# Patient Record
Sex: FEMALE | Race: ASIAN | Employment: UNEMPLOYED | URBAN - METROPOLITAN AREA
[De-identification: names, ages, dates, MRNs, and addresses within clinical notes are randomized per-mention and may not be internally consistent; named-entity substitution may affect disease eponyms.]

---
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
## 2023-10-09 ENCOUNTER — OFFICE VISIT (OUTPATIENT)
Dept: INTERNAL MEDICINE CLINIC | Facility: CLINIC | Age: 23
End: 2023-10-09
Payer: COMMERCIAL

## 2023-10-09 ENCOUNTER — LAB ENCOUNTER (OUTPATIENT)
Dept: LAB | Age: 23
End: 2023-10-09
Attending: INTERNAL MEDICINE
Payer: COMMERCIAL

## 2023-10-09 VITALS
DIASTOLIC BLOOD PRESSURE: 68 MMHG | HEART RATE: 99 BPM | WEIGHT: 178.38 LBS | HEIGHT: 64 IN | SYSTOLIC BLOOD PRESSURE: 110 MMHG | TEMPERATURE: 97 F | OXYGEN SATURATION: 99 % | BODY MASS INDEX: 30.45 KG/M2 | RESPIRATION RATE: 17 BRPM

## 2023-10-09 DIAGNOSIS — Z13.228 SCREENING FOR METABOLIC DISORDER: ICD-10-CM

## 2023-10-09 DIAGNOSIS — Z13.0 SCREENING FOR BLOOD DISEASE: ICD-10-CM

## 2023-10-09 DIAGNOSIS — Z23 NEEDS FLU SHOT: ICD-10-CM

## 2023-10-09 DIAGNOSIS — Z00.00 WELLNESS EXAMINATION: Primary | ICD-10-CM

## 2023-10-09 DIAGNOSIS — Z13.1 SCREENING FOR DIABETES MELLITUS: ICD-10-CM

## 2023-10-09 DIAGNOSIS — F90.9 ATTENTION DEFICIT HYPERACTIVITY DISORDER (ADHD), UNSPECIFIED ADHD TYPE: ICD-10-CM

## 2023-10-09 DIAGNOSIS — Z13.29 SCREENING FOR THYROID DISORDER: ICD-10-CM

## 2023-10-09 DIAGNOSIS — Z13.220 SCREENING, LIPID: ICD-10-CM

## 2023-10-09 PROBLEM — F90.2 ATTENTION DEFICIT HYPERACTIVITY DISORDER (ADHD), COMBINED TYPE: Status: ACTIVE | Noted: 2023-10-09

## 2023-10-09 LAB
ALBUMIN SERPL-MCNC: 4 G/DL (ref 3.4–5)
ALBUMIN/GLOB SERPL: 1 {RATIO} (ref 1–2)
ALP LIVER SERPL-CCNC: 49 U/L
ALT SERPL-CCNC: 34 U/L
ANION GAP SERPL CALC-SCNC: 5 MMOL/L (ref 0–18)
AST SERPL-CCNC: 12 U/L (ref 15–37)
BASOPHILS # BLD AUTO: 0.08 X10(3) UL (ref 0–0.2)
BASOPHILS NFR BLD AUTO: 0.5 %
BILIRUB SERPL-MCNC: 0.3 MG/DL (ref 0.1–2)
BUN BLD-MCNC: 7 MG/DL (ref 7–18)
CALCIUM BLD-MCNC: 10.4 MG/DL (ref 8.5–10.1)
CHLORIDE SERPL-SCNC: 105 MMOL/L (ref 98–112)
CHOLEST SERPL-MCNC: 167 MG/DL (ref ?–200)
CO2 SERPL-SCNC: 24 MMOL/L (ref 21–32)
CREAT BLD-MCNC: 0.82 MG/DL
EGFRCR SERPLBLD CKD-EPI 2021: 102 ML/MIN/1.73M2 (ref 60–?)
EOSINOPHIL # BLD AUTO: 0.12 X10(3) UL (ref 0–0.7)
EOSINOPHIL NFR BLD AUTO: 0.8 %
ERYTHROCYTE [DISTWIDTH] IN BLOOD BY AUTOMATED COUNT: 13.3 %
FASTING PATIENT LIPID ANSWER: YES
FASTING STATUS PATIENT QL REPORTED: YES
GLOBULIN PLAS-MCNC: 4.1 G/DL (ref 2.8–4.4)
GLUCOSE BLD-MCNC: 91 MG/DL (ref 70–99)
HCT VFR BLD AUTO: 38.4 %
HDLC SERPL-MCNC: 39 MG/DL (ref 40–59)
HGB BLD-MCNC: 12.4 G/DL
IMM GRANULOCYTES # BLD AUTO: 0.08 X10(3) UL (ref 0–1)
IMM GRANULOCYTES NFR BLD: 0.5 %
LDLC SERPL CALC-MCNC: 98 MG/DL (ref ?–100)
LYMPHOCYTES # BLD AUTO: 2.11 X10(3) UL (ref 1–4)
LYMPHOCYTES NFR BLD AUTO: 13.4 %
MCH RBC QN AUTO: 27.7 PG (ref 26–34)
MCHC RBC AUTO-ENTMCNC: 32.3 G/DL (ref 31–37)
MCV RBC AUTO: 85.7 FL
MONOCYTES # BLD AUTO: 0.77 X10(3) UL (ref 0.1–1)
MONOCYTES NFR BLD AUTO: 4.9 %
NEUTROPHILS # BLD AUTO: 12.57 X10 (3) UL (ref 1.5–7.7)
NEUTROPHILS # BLD AUTO: 12.57 X10(3) UL (ref 1.5–7.7)
NEUTROPHILS NFR BLD AUTO: 79.9 %
NONHDLC SERPL-MCNC: 128 MG/DL (ref ?–130)
OSMOLALITY SERPL CALC.SUM OF ELEC: 276 MOSM/KG (ref 275–295)
PLATELET # BLD AUTO: 376 10(3)UL (ref 150–450)
POTASSIUM SERPL-SCNC: 4.1 MMOL/L (ref 3.5–5.1)
PROT SERPL-MCNC: 8.1 G/DL (ref 6.4–8.2)
RBC # BLD AUTO: 4.48 X10(6)UL
SODIUM SERPL-SCNC: 134 MMOL/L (ref 136–145)
TRIGL SERPL-MCNC: 173 MG/DL (ref 30–149)
TSI SER-ACNC: 1.43 MIU/ML (ref 0.36–3.74)
VLDLC SERPL CALC-MCNC: 29 MG/DL (ref 0–30)
WBC # BLD AUTO: 15.7 X10(3) UL (ref 4–11)

## 2023-10-09 PROCEDURE — 90471 IMMUNIZATION ADMIN: CPT | Performed by: INTERNAL MEDICINE

## 2023-10-09 PROCEDURE — 80050 GENERAL HEALTH PANEL: CPT | Performed by: INTERNAL MEDICINE

## 2023-10-09 PROCEDURE — 3074F SYST BP LT 130 MM HG: CPT | Performed by: INTERNAL MEDICINE

## 2023-10-09 PROCEDURE — 99385 PREV VISIT NEW AGE 18-39: CPT | Performed by: INTERNAL MEDICINE

## 2023-10-09 PROCEDURE — 83036 HEMOGLOBIN GLYCOSYLATED A1C: CPT | Performed by: INTERNAL MEDICINE

## 2023-10-09 PROCEDURE — 80061 LIPID PANEL: CPT | Performed by: INTERNAL MEDICINE

## 2023-10-09 PROCEDURE — 3078F DIAST BP <80 MM HG: CPT | Performed by: INTERNAL MEDICINE

## 2023-10-09 PROCEDURE — 3008F BODY MASS INDEX DOCD: CPT | Performed by: INTERNAL MEDICINE

## 2023-10-09 PROCEDURE — 90686 IIV4 VACC NO PRSV 0.5 ML IM: CPT | Performed by: INTERNAL MEDICINE

## 2023-10-09 RX ORDER — LEVONORGESTREL AND ETHINYL ESTRADIOL 0.15-0.03
KIT ORAL
COMMUNITY
Start: 2023-09-07

## 2023-10-09 RX ORDER — DEXTROAMPHETAMINE SACCHARATE, AMPHETAMINE ASPARTATE, DEXTROAMPHETAMINE SULFATE AND AMPHETAMINE SULFATE 2.5; 2.5; 2.5; 2.5 MG/1; MG/1; MG/1; MG/1
10 TABLET ORAL DAILY
Qty: 30 TABLET | Refills: 0 | Status: SHIPPED | OUTPATIENT
Start: 2023-10-09

## 2023-10-09 RX ORDER — DEXTROAMPHETAMINE SACCHARATE, AMPHETAMINE ASPARTATE, DEXTROAMPHETAMINE SULFATE AND AMPHETAMINE SULFATE 2.5; 2.5; 2.5; 2.5 MG/1; MG/1; MG/1; MG/1
TABLET ORAL
COMMUNITY
Start: 2023-04-28 | End: 2023-10-09

## 2023-10-10 LAB
EST. AVERAGE GLUCOSE BLD GHB EST-MCNC: 120 MG/DL (ref 68–126)
HBA1C MFR BLD: 5.8 % (ref ?–5.7)

## 2023-10-17 ENCOUNTER — TELEMEDICINE (OUTPATIENT)
Dept: INTERNAL MEDICINE CLINIC | Facility: CLINIC | Age: 23
End: 2023-10-17
Payer: COMMERCIAL

## 2023-10-17 DIAGNOSIS — E83.52 HYPERCALCEMIA: ICD-10-CM

## 2023-10-17 DIAGNOSIS — E87.1 HYPONATREMIA: ICD-10-CM

## 2023-10-17 DIAGNOSIS — D72.829 LEUKOCYTOSIS, UNSPECIFIED TYPE: ICD-10-CM

## 2023-10-17 DIAGNOSIS — R73.03 PRE-DIABETES: ICD-10-CM

## 2023-10-17 DIAGNOSIS — E78.1 HYPERTRIGLYCERIDEMIA: Primary | ICD-10-CM

## 2023-10-17 PROCEDURE — 99441 PHONE E/M BY PHYS 5-10 MIN: CPT | Performed by: INTERNAL MEDICINE

## 2023-10-17 NOTE — PROGRESS NOTES
José Luis Wells is a 24year old female. HPI:    We discussed the following labs:  A1c 5.8%   Diet and exercise, declines metformin   HDL 39    Diet and exercise  Na 134    Repeat  Ca 10.4    Repeat  WBC 15.7   Repeat, no previous illnesses prior         Allergies:  No Known Allergies   Current Meds:  Current Outpatient Medications   Medication Sig Dispense Refill    KURVELO 0.15-30 MG-MCG Oral Tab       amphetamine-dextroamphetamine 10 MG Oral Tab Take 1 tablet (10 mg total) by mouth daily. 30 tablet 0        PMH:   No past medical history on file. ROS:   GENERAL: Negative for fever, chills and fatigue. NAD. HENT: Negative for congestion, sore throat, and ear pain. RESPIRATORY: Negative for cough, chest tightness, shortness of breath and wheezing. CV: Negative for chest pain, palpitations and leg swelling. GI: Negative for nausea, vomiting, abdominal pain, diarrhea, and blood in stool. : Negative for dysuria, hematuria and difficulty urinating. MUSCULOSKELETAL: Negative for myalgias, back pain, joint swelling, arthralgias and gait problem. NEURO: Negative for dizziness, syncope, weakness, numbness, tingling and headaches. PSYCH: The patient is not nervous/anxious. No depression. PHYSICAL EXAM:   No vital signs or physical exam completed for this visit as visit was done via telehealth. ASSESSMENT/ PLAN:   1. Hyponatremia  - Comp Metabolic Panel (14) [E]; Future    2. Leukocytosis, unspecified type  - CBC W Differential W Platelet [E]; Future    3. Pre-diabetes  Lifestyle modifications including diet and exercise    4. Hypercalcemia    - Comp Metabolic Panel (14) [E]; Future    5. Hypertriglyceridemia  Lifestyle modifications including diet and exercise             Pt indicates understanding and agrees to the plan. No follow-ups on file.     Jj Harper, DO        José Luis Wells understands phone evaluation is not a substitute for face-to-face examination or emergency care. Patient advised to go to ER or call 911 for worsening symptoms or acute distress. Please note that the following visit was completed using two-way, real-time interactive audio (total time 10 minutes) communication. This has been done in good divya to provide continuity of care in the best interest of the provider-patient relationship, due to the on-going public health crisis/national emergency and because of restrictions of visitation. There are limitations of this visit as no physical exam could be performed. Every conscious effort was taken to allow for sufficient and adequate time. This billing visit was spent on reviewing labs, medications, radiology tests and decision making. Appropriate medical decision-making and tests are ordered as detailed in the plan of care above.

## 2023-11-17 ENCOUNTER — LAB ENCOUNTER (OUTPATIENT)
Dept: LAB | Age: 23
End: 2023-11-17
Attending: INTERNAL MEDICINE
Payer: COMMERCIAL

## 2023-11-17 DIAGNOSIS — E83.52 HYPERCALCEMIA: ICD-10-CM

## 2023-11-17 DIAGNOSIS — D64.9 NORMOCYTIC ANEMIA: ICD-10-CM

## 2023-11-17 DIAGNOSIS — E87.1 HYPONATREMIA: ICD-10-CM

## 2023-11-17 DIAGNOSIS — D64.9 NORMOCYTIC ANEMIA: Primary | ICD-10-CM

## 2023-11-17 DIAGNOSIS — D72.829 LEUKOCYTOSIS, UNSPECIFIED TYPE: ICD-10-CM

## 2023-11-17 LAB
ALBUMIN SERPL-MCNC: 3.7 G/DL (ref 3.4–5)
ALBUMIN/GLOB SERPL: 1 {RATIO} (ref 1–2)
ALP LIVER SERPL-CCNC: 55 U/L
ALT SERPL-CCNC: 44 U/L
ANION GAP SERPL CALC-SCNC: 5 MMOL/L (ref 0–18)
AST SERPL-CCNC: 19 U/L (ref 15–37)
BASOPHILS # BLD AUTO: 0.06 X10(3) UL (ref 0–0.2)
BASOPHILS NFR BLD AUTO: 0.7 %
BILIRUB SERPL-MCNC: 0.4 MG/DL (ref 0.1–2)
BUN BLD-MCNC: 8 MG/DL (ref 9–23)
CALCIUM BLD-MCNC: 9.1 MG/DL (ref 8.5–10.1)
CHLORIDE SERPL-SCNC: 106 MMOL/L (ref 98–112)
CO2 SERPL-SCNC: 25 MMOL/L (ref 21–32)
CREAT BLD-MCNC: 0.7 MG/DL
DEPRECATED HBV CORE AB SER IA-ACNC: 45.5 NG/ML
EGFRCR SERPLBLD CKD-EPI 2021: 124 ML/MIN/1.73M2 (ref 60–?)
EOSINOPHIL # BLD AUTO: 0.22 X10(3) UL (ref 0–0.7)
EOSINOPHIL NFR BLD AUTO: 2.4 %
ERYTHROCYTE [DISTWIDTH] IN BLOOD BY AUTOMATED COUNT: 12.8 %
FASTING STATUS PATIENT QL REPORTED: YES
GLOBULIN PLAS-MCNC: 3.8 G/DL (ref 2.8–4.4)
GLUCOSE BLD-MCNC: 86 MG/DL (ref 70–99)
HCT VFR BLD AUTO: 37.3 %
HGB BLD-MCNC: 11.6 G/DL
IMM GRANULOCYTES # BLD AUTO: 0.03 X10(3) UL (ref 0–1)
IMM GRANULOCYTES NFR BLD: 0.3 %
IRON SATN MFR SERPL: 20 %
IRON SERPL-MCNC: 80 UG/DL
LYMPHOCYTES # BLD AUTO: 2.2 X10(3) UL (ref 1–4)
LYMPHOCYTES NFR BLD AUTO: 24.1 %
MCH RBC QN AUTO: 27 PG (ref 26–34)
MCHC RBC AUTO-ENTMCNC: 31.1 G/DL (ref 31–37)
MCV RBC AUTO: 86.7 FL
MONOCYTES # BLD AUTO: 0.69 X10(3) UL (ref 0.1–1)
MONOCYTES NFR BLD AUTO: 7.6 %
NEUTROPHILS # BLD AUTO: 5.91 X10 (3) UL (ref 1.5–7.7)
NEUTROPHILS # BLD AUTO: 5.91 X10(3) UL (ref 1.5–7.7)
NEUTROPHILS NFR BLD AUTO: 64.9 %
OSMOLALITY SERPL CALC.SUM OF ELEC: 280 MOSM/KG (ref 275–295)
PLATELET # BLD AUTO: 290 10(3)UL (ref 150–450)
POTASSIUM SERPL-SCNC: 4.2 MMOL/L (ref 3.5–5.1)
PROT SERPL-MCNC: 7.5 G/DL (ref 6.4–8.2)
RBC # BLD AUTO: 4.3 X10(6)UL
SODIUM SERPL-SCNC: 136 MMOL/L (ref 136–145)
TIBC SERPL-MCNC: 407 UG/DL (ref 240–450)
TRANSFERRIN SERPL-MCNC: 273 MG/DL (ref 200–360)
WBC # BLD AUTO: 9.1 X10(3) UL (ref 4–11)

## 2023-11-17 PROCEDURE — 83540 ASSAY OF IRON: CPT

## 2023-11-17 PROCEDURE — 85025 COMPLETE CBC W/AUTO DIFF WBC: CPT

## 2023-11-17 PROCEDURE — 83550 IRON BINDING TEST: CPT

## 2023-11-17 PROCEDURE — 82728 ASSAY OF FERRITIN: CPT

## 2023-11-17 PROCEDURE — 80053 COMPREHEN METABOLIC PANEL: CPT

## 2023-11-30 RX ORDER — DEXTROAMPHETAMINE SACCHARATE, AMPHETAMINE ASPARTATE, DEXTROAMPHETAMINE SULFATE AND AMPHETAMINE SULFATE 2.5; 2.5; 2.5; 2.5 MG/1; MG/1; MG/1; MG/1
10 TABLET ORAL DAILY
Qty: 30 TABLET | Refills: 0 | Status: SHIPPED | OUTPATIENT
Start: 2023-11-30

## 2023-11-30 NOTE — TELEPHONE ENCOUNTER
Last OV: Acute 10/17/23 MP    No future appointments.     Latest labs:   CBC,CMP 11/17/23    Latest RX:   Medication Quantity Refills Start End   amphetamine-dextroamphetamine 10 MG Oral Tab 30 tablet 0 10/9/2023    Sig:   Take 1 tablet (10 mg total) by mouth daily.       Per protocol

## 2023-12-01 NOTE — TELEPHONE ENCOUNTER
Can someone ask the patient if she has record of pap smear? Per patient it is complete. Need to update care gap

## 2024-01-04 NOTE — TELEPHONE ENCOUNTER
Patient scheduled for pap.    Future Appointments   Date Time Provider Department Center   1/15/2024  3:20 PM Jj Harper,  [facility]

## 2024-01-22 ENCOUNTER — OFFICE VISIT (OUTPATIENT)
Dept: INTERNAL MEDICINE CLINIC | Facility: CLINIC | Age: 24
End: 2024-01-22
Payer: COMMERCIAL

## 2024-01-22 VITALS
RESPIRATION RATE: 16 BRPM | HEART RATE: 93 BPM | DIASTOLIC BLOOD PRESSURE: 68 MMHG | WEIGHT: 175 LBS | SYSTOLIC BLOOD PRESSURE: 116 MMHG | TEMPERATURE: 98 F | BODY MASS INDEX: 29.88 KG/M2 | HEIGHT: 64 IN | OXYGEN SATURATION: 96 %

## 2024-01-22 DIAGNOSIS — Z12.4 SCREENING FOR CERVICAL CANCER: Primary | ICD-10-CM

## 2024-01-22 PROCEDURE — 3078F DIAST BP <80 MM HG: CPT | Performed by: INTERNAL MEDICINE

## 2024-01-22 PROCEDURE — 3008F BODY MASS INDEX DOCD: CPT | Performed by: INTERNAL MEDICINE

## 2024-01-22 PROCEDURE — 88175 CYTOPATH C/V AUTO FLUID REDO: CPT | Performed by: INTERNAL MEDICINE

## 2024-01-22 PROCEDURE — 3074F SYST BP LT 130 MM HG: CPT | Performed by: INTERNAL MEDICINE

## 2024-01-22 NOTE — PROGRESS NOTES
Lisandra Aiken  7/23/2000    Chief Complaint   Patient presents with    Pap     TA RM     SUBJECTIVE   Lisandra Aiken is a 23 year old female who presents for pap smear. No complaints.    Declines Chlamydia screening. Will check records for TDaP.    Review of Systems   Review of Systems   No f/c/chest pain or sob. No cough. No abd pain/n/v/d. No ha or dizziness. No numbness, tingling, or weakness. No other complaints today.    OBJECTIVE:   /68   Pulse 93   Temp 98.4 °F (36.9 °C) (Temporal)   Resp 16   Ht 5' 4\" (1.626 m)   Wt 175 lb (79.4 kg)   SpO2 96%   BMI 30.04 kg/m²   Physical Exam   Constitutional: Oriented to person, place, and time. No distress.   Pulmonary/Chest: Effort normal. No respiratory distress.  Vulvovaginal: Papular lesion on external vulvar structure per patient from shaving. White discharge in vaginal vault. Normal appearing cervix.    Lab Results   Component Value Date    GLU 86 11/17/2023    BUN 8 (L) 11/17/2023    CREATSERUM 0.70 11/17/2023    ANIONGAP 5 11/17/2023    CA 9.1 11/17/2023     11/17/2023    K 4.2 11/17/2023     11/17/2023    CO2 25.0 11/17/2023    OSMOCALC 280 11/17/2023      Lab Results   Component Value Date    WBC 9.1 11/17/2023    RBC 4.30 11/17/2023    HGB 11.6 (L) 11/17/2023    HCT 37.3 11/17/2023    MCV 86.7 11/17/2023    MCH 27.0 11/17/2023    MCHC 31.1 11/17/2023    RDW 12.8 11/17/2023    .0 11/17/2023      Lab Results   Component Value Date    TSH 1.430 10/09/2023        ASSESSMENT AND PLAN:       ICD-10-CM    1. Screening for cervical cancer  Z12.4 ThinPrep PAP Smear     The patient indicates understanding of these issues and agrees to the plan.  The patient is asked to return or present to the emergency room for worsening of symptoms.    TODAY'S ORDERS     No orders of the defined types were placed in this encounter.      Meds & Refills:  Requested Prescriptions      No prescriptions requested or ordered in this  encounter       Imaging & Consults:  None    No follow-ups on file.  There are no Patient Instructions on file for this visit.    All questions were answered and the patient agrees with the plan.     Thank you,  Jj Harper, DO

## 2024-01-25 LAB
.: NORMAL
.: NORMAL

## 2024-03-19 ENCOUNTER — LAB ENCOUNTER (OUTPATIENT)
Dept: LAB | Age: 24
End: 2024-03-19
Attending: INTERNAL MEDICINE
Payer: COMMERCIAL

## 2024-03-19 DIAGNOSIS — D64.9 NORMOCYTIC ANEMIA: ICD-10-CM

## 2024-03-19 LAB
HCT VFR BLD AUTO: 36.7 %
HGB BLD-MCNC: 11.5 G/DL

## 2024-03-19 PROCEDURE — 85014 HEMATOCRIT: CPT | Performed by: INTERNAL MEDICINE

## 2024-03-19 PROCEDURE — 85018 HEMOGLOBIN: CPT | Performed by: INTERNAL MEDICINE

## 2024-03-21 DIAGNOSIS — F90.9 ATTENTION DEFICIT HYPERACTIVITY DISORDER (ADHD), UNSPECIFIED ADHD TYPE: ICD-10-CM

## 2024-03-21 PROBLEM — D64.9 NORMOCYTIC ANEMIA: Status: ACTIVE | Noted: 2024-03-21

## 2024-03-21 RX ORDER — DEXTROAMPHETAMINE SACCHARATE, AMPHETAMINE ASPARTATE, DEXTROAMPHETAMINE SULFATE AND AMPHETAMINE SULFATE 2.5; 2.5; 2.5; 2.5 MG/1; MG/1; MG/1; MG/1
10 TABLET ORAL DAILY
Qty: 30 TABLET | Refills: 0 | Status: SHIPPED | OUTPATIENT
Start: 2024-03-21

## 2024-04-11 ENCOUNTER — LAB ENCOUNTER (OUTPATIENT)
Dept: LAB | Age: 24
End: 2024-04-11
Attending: INTERNAL MEDICINE
Payer: COMMERCIAL

## 2024-04-11 DIAGNOSIS — D64.9 NORMOCYTIC ANEMIA: ICD-10-CM

## 2024-04-11 LAB
FOLATE SERPL-MCNC: 27.5 NG/ML (ref 8.7–?)
VIT B12 SERPL-MCNC: 785 PG/ML (ref 193–986)

## 2024-04-11 PROCEDURE — 82607 VITAMIN B-12: CPT

## 2024-04-11 PROCEDURE — 82746 ASSAY OF FOLIC ACID SERUM: CPT

## 2024-05-27 DIAGNOSIS — F90.9 ATTENTION DEFICIT HYPERACTIVITY DISORDER (ADHD), UNSPECIFIED ADHD TYPE: ICD-10-CM

## 2024-05-30 RX ORDER — DEXTROAMPHETAMINE SACCHARATE, AMPHETAMINE ASPARTATE, DEXTROAMPHETAMINE SULFATE AND AMPHETAMINE SULFATE 2.5; 2.5; 2.5; 2.5 MG/1; MG/1; MG/1; MG/1
10 TABLET ORAL DAILY
Qty: 30 TABLET | Refills: 0 | Status: SHIPPED | OUTPATIENT
Start: 2024-05-30 | End: 2024-08-14

## 2024-05-30 NOTE — TELEPHONE ENCOUNTER
Please review. Protocol Failed; No Protocol    Recent fills: 2/2/2024, 3/22/2024  Last Rx written: 3/21/2024  Last office visit: 1/22/2024      Requested Prescriptions   Pending Prescriptions Disp Refills    amphetamine-dextroamphetamine 10 MG Oral Tab 30 tablet 0     Sig: Take 1 tablet (10 mg total) by mouth daily.       Controlled Substance Medication Failed - 5/27/2024  2:37 PM        Failed - This medication is a controlled substance - forward to provider to refill                 Recent Outpatient Visits              4 months ago Screening for cervical cancer    [facility], [address] [place], [place] [address]Silvestre Maryl Louisa,     Office Visit    7 months ago Hypertriglyceridemia    [address]     Telemedicine    7 months ago Wellness examination    [address]     Office Visit    7 years ago Need for prophylactic vaccination and inoculation against viral hepatitis    Pediatrics - [facility]    Nurse Only    7 years ago Encounter for routine child health examination without abnormal findings [Z00.129]    [facility]

## 2024-07-11 ENCOUNTER — LAB ENCOUNTER (OUTPATIENT)
Dept: LAB | Age: 24
End: 2024-07-11
Attending: INTERNAL MEDICINE
Payer: COMMERCIAL

## 2024-07-11 DIAGNOSIS — D64.9 ANEMIA, UNSPECIFIED TYPE: ICD-10-CM

## 2024-07-11 LAB
ERYTHROCYTE [DISTWIDTH] IN BLOOD BY AUTOMATED COUNT: 13 %
HCT VFR BLD AUTO: 36.6 %
HGB BLD-MCNC: 11.7 G/DL
MCH RBC QN AUTO: 27.4 PG (ref 26–34)
MCHC RBC AUTO-ENTMCNC: 32 G/DL (ref 31–37)
MCV RBC AUTO: 85.7 FL
PLATELET # BLD AUTO: 306 10(3)UL (ref 150–450)
RBC # BLD AUTO: 4.27 X10(6)UL
WBC # BLD AUTO: 9.2 X10(3) UL (ref 4–11)

## 2024-07-11 PROCEDURE — 85027 COMPLETE CBC AUTOMATED: CPT | Performed by: INTERNAL MEDICINE

## 2024-07-12 RX ORDER — LEVONORGESTREL AND ETHINYL ESTRADIOL 0.15-0.03
1 KIT ORAL DAILY
Qty: 84 TABLET | Refills: 3 | Status: SHIPPED | OUTPATIENT
Start: 2024-07-12

## 2024-07-12 NOTE — TELEPHONE ENCOUNTER
Refill passed per [facility] protocol.  Requested Prescriptions   Pending Prescriptions Disp Refills    KURVELO 0.15-30 MG-MCG Oral Tab 28 tablet 0       Gynecology Medication Protocol Failed - 7/9/2024 11:20 PM        Failed - PASS-PENDING LAST PAP WNL--VIA MANUAL LOOKUP        Passed - Physical or Pelvic/Breast in past 12 or next 3 mos--VIA MANUAL LOOKUP           Normal PAP on 1/22/24, not due for 3 years.

## 2024-08-14 DIAGNOSIS — F90.9 ATTENTION DEFICIT HYPERACTIVITY DISORDER (ADHD), UNSPECIFIED ADHD TYPE: ICD-10-CM

## 2024-08-19 RX ORDER — DEXTROAMPHETAMINE SACCHARATE, AMPHETAMINE ASPARTATE, DEXTROAMPHETAMINE SULFATE AND AMPHETAMINE SULFATE 2.5; 2.5; 2.5; 2.5 MG/1; MG/1; MG/1; MG/1
10 TABLET ORAL DAILY
Qty: 30 TABLET | Refills: 0 | Status: SHIPPED | OUTPATIENT
Start: 2024-08-19 | End: 2024-10-10

## 2024-09-24 ENCOUNTER — OFFICE VISIT (OUTPATIENT)
Dept: INTERNAL MEDICINE CLINIC | Facility: CLINIC | Age: 24
End: 2024-09-24
Payer: COMMERCIAL

## 2024-09-24 ENCOUNTER — LAB ENCOUNTER (OUTPATIENT)
Dept: LAB | Age: 24
End: 2024-09-24
Attending: INTERNAL MEDICINE
Payer: COMMERCIAL

## 2024-09-24 VITALS
OXYGEN SATURATION: 99 % | HEIGHT: 64 IN | SYSTOLIC BLOOD PRESSURE: 108 MMHG | TEMPERATURE: 98 F | WEIGHT: 177.63 LBS | BODY MASS INDEX: 30.32 KG/M2 | RESPIRATION RATE: 17 BRPM | HEART RATE: 80 BPM | DIASTOLIC BLOOD PRESSURE: 60 MMHG

## 2024-09-24 DIAGNOSIS — J01.00 ACUTE NON-RECURRENT MAXILLARY SINUSITIS: ICD-10-CM

## 2024-09-24 DIAGNOSIS — R73.03 PRE-DIABETES: ICD-10-CM

## 2024-09-24 DIAGNOSIS — F90.9 ATTENTION DEFICIT HYPERACTIVITY DISORDER (ADHD), UNSPECIFIED ADHD TYPE: ICD-10-CM

## 2024-09-24 DIAGNOSIS — E87.1 HYPONATREMIA: ICD-10-CM

## 2024-09-24 DIAGNOSIS — E78.1 HYPERTRIGLYCERIDEMIA: ICD-10-CM

## 2024-09-24 DIAGNOSIS — D64.9 NORMOCYTIC ANEMIA: ICD-10-CM

## 2024-09-24 DIAGNOSIS — Z23 NEED FOR TETANUS, DIPHTHERIA, AND ACELLULAR PERTUSSIS (TDAP) VACCINE: ICD-10-CM

## 2024-09-24 DIAGNOSIS — Z00.00 WELLNESS EXAMINATION: Primary | ICD-10-CM

## 2024-09-24 DIAGNOSIS — R68.84 JAW PAIN: ICD-10-CM

## 2024-09-24 DIAGNOSIS — D64.9 ANEMIA, UNSPECIFIED TYPE: ICD-10-CM

## 2024-09-24 LAB
ANION GAP SERPL CALC-SCNC: 10 MMOL/L (ref 0–18)
BASOPHILS # BLD AUTO: 0.06 X10(3) UL (ref 0–0.2)
BASOPHILS NFR BLD AUTO: 0.7 %
BUN BLD-MCNC: 6 MG/DL (ref 9–23)
CALCIUM BLD-MCNC: 9.9 MG/DL (ref 8.7–10.4)
CHLORIDE SERPL-SCNC: 103 MMOL/L (ref 98–112)
CHOLEST SERPL-MCNC: 140 MG/DL (ref ?–200)
CO2 SERPL-SCNC: 25 MMOL/L (ref 21–32)
CREAT BLD-MCNC: 0.74 MG/DL
EGFRCR SERPLBLD CKD-EPI 2021: 115 ML/MIN/1.73M2 (ref 60–?)
EOSINOPHIL # BLD AUTO: 0.22 X10(3) UL (ref 0–0.7)
EOSINOPHIL NFR BLD AUTO: 2.5 %
ERYTHROCYTE [DISTWIDTH] IN BLOOD BY AUTOMATED COUNT: 13 %
EST. AVERAGE GLUCOSE BLD GHB EST-MCNC: 120 MG/DL (ref 68–126)
FASTING PATIENT LIPID ANSWER: YES
FASTING STATUS PATIENT QL REPORTED: YES
GLUCOSE BLD-MCNC: 91 MG/DL (ref 70–99)
HBA1C MFR BLD: 5.8 % (ref ?–5.7)
HCT VFR BLD AUTO: 37.6 %
HDLC SERPL-MCNC: 38 MG/DL (ref 40–59)
HGB BLD-MCNC: 12.2 G/DL
IMM GRANULOCYTES # BLD AUTO: 0.02 X10(3) UL (ref 0–1)
IMM GRANULOCYTES NFR BLD: 0.2 %
LDLC SERPL CALC-MCNC: 79 MG/DL (ref ?–100)
LYMPHOCYTES # BLD AUTO: 2.32 X10(3) UL (ref 1–4)
LYMPHOCYTES NFR BLD AUTO: 26.6 %
MCH RBC QN AUTO: 27.6 PG (ref 26–34)
MCHC RBC AUTO-ENTMCNC: 32.4 G/DL (ref 31–37)
MCV RBC AUTO: 85.1 FL
MONOCYTES # BLD AUTO: 0.62 X10(3) UL (ref 0.1–1)
MONOCYTES NFR BLD AUTO: 7.1 %
NEUTROPHILS # BLD AUTO: 5.49 X10 (3) UL (ref 1.5–7.7)
NEUTROPHILS # BLD AUTO: 5.49 X10(3) UL (ref 1.5–7.7)
NEUTROPHILS NFR BLD AUTO: 62.9 %
NONHDLC SERPL-MCNC: 102 MG/DL (ref ?–130)
OSMOLALITY SERPL CALC.SUM OF ELEC: 283 MOSM/KG (ref 275–295)
PLATELET # BLD AUTO: 299 10(3)UL (ref 150–450)
POTASSIUM SERPL-SCNC: 4.2 MMOL/L (ref 3.5–5.1)
RBC # BLD AUTO: 4.42 X10(6)UL
SODIUM SERPL-SCNC: 138 MMOL/L (ref 136–145)
TRIGL SERPL-MCNC: 127 MG/DL (ref 30–149)
VLDLC SERPL CALC-MCNC: 20 MG/DL (ref 0–30)
WBC # BLD AUTO: 8.7 X10(3) UL (ref 4–11)

## 2024-09-24 PROCEDURE — 90471 IMMUNIZATION ADMIN: CPT | Performed by: INTERNAL MEDICINE

## 2024-09-24 PROCEDURE — 3008F BODY MASS INDEX DOCD: CPT | Performed by: INTERNAL MEDICINE

## 2024-09-24 PROCEDURE — 83036 HEMOGLOBIN GLYCOSYLATED A1C: CPT | Performed by: INTERNAL MEDICINE

## 2024-09-24 PROCEDURE — 80048 BASIC METABOLIC PNL TOTAL CA: CPT | Performed by: INTERNAL MEDICINE

## 2024-09-24 PROCEDURE — 3074F SYST BP LT 130 MM HG: CPT | Performed by: INTERNAL MEDICINE

## 2024-09-24 PROCEDURE — 3078F DIAST BP <80 MM HG: CPT | Performed by: INTERNAL MEDICINE

## 2024-09-24 PROCEDURE — 90715 TDAP VACCINE 7 YRS/> IM: CPT | Performed by: INTERNAL MEDICINE

## 2024-09-24 PROCEDURE — 99395 PREV VISIT EST AGE 18-39: CPT | Performed by: INTERNAL MEDICINE

## 2024-09-24 PROCEDURE — 85025 COMPLETE CBC W/AUTO DIFF WBC: CPT | Performed by: INTERNAL MEDICINE

## 2024-09-24 PROCEDURE — 80061 LIPID PANEL: CPT | Performed by: INTERNAL MEDICINE

## 2024-09-24 RX ORDER — KETOCONAZOLE 20 MG/ML
SHAMPOO TOPICAL
COMMUNITY
Start: 2024-09-13

## 2024-09-24 RX ORDER — TACROLIMUS 1 MG/G
OINTMENT TOPICAL
COMMUNITY
Start: 2024-07-16

## 2024-09-24 RX ORDER — TRIAMCINOLONE ACETONIDE 1 MG/G
CREAM TOPICAL
COMMUNITY
Start: 2024-04-16

## 2024-09-24 RX ORDER — PREDNISONE 20 MG/1
20 TABLET ORAL DAILY
Qty: 5 TABLET | Refills: 0 | Status: SHIPPED | OUTPATIENT
Start: 2024-09-24 | End: 2024-09-29

## 2024-09-24 NOTE — PROGRESS NOTES
Lisandra Aiken  7/23/2000    No chief complaint on file.    SUBJECTIVE   Lisandra Aiken is a 24 year old female who presents for annual physical examination.    Has been taking OCP inconsistently.     Recent CBC with mild anemia. Hemoglobin 11.7. Fe, B12 and Folate are within range. Periods are short only 2 d.    Has laser procedure scheduled for partial retinal detachment on 9/25 @ [facility].    She was seen in outside IC/UC for pain behind eye, in front of ear and over maxillary sinus. She completed course of antibiotics that helped only somewhat. Denies ongoing congestion. Has slight visual blurring ?    Review of Systems   Review of Systems   No f/c/chest pain or sob. No cough. No abd pain/n/v/d. No ha or dizziness. No numbness, tingling, or weakness.     OBJECTIVE:   /60   Pulse 80   Temp 98.3 °F (36.8 °C) (Temporal)   Resp 17   Ht 5' 4\" (1.626 m)   Wt 177 lb 9.6 oz (80.6 kg)   LMP 09/10/2024   SpO2 99%   BMI 30.48 kg/m²   Physical Exam   Constitutional: Oriented to person, place, and time. No distress.   HEENT:  Normocephalic and atraumatic. Tympanic membranes appear normal.  Nose normal. Oropharynx is clear and moist. Clicking right jaw with opening and closing of the mouth. Mild tenderness over maxilla.  Eyes: Conjunctivae wnl. PERRLA.  Neck: Normal range of motion. Neck supple.   Cardiovascular: Normal rate, regular rhythm and intact distal pulses.  No murmur, rubs or gallops.   Pulmonary/Chest: Effort normal and breath sounds normal. No respiratory distress.  Abdominal: Soft. Bowel sounds are present. Non tender, no masses, no organomegaly or hernias.  Musculoskeletal: No edema  Lymphadenopathy: No cervical adenopathy.   Neurological: No focal neurological deficits  Skin: Skin is warm and dry. No rash.  Psychiatric: Normal mood and affect.     Lab Results   Component Value Date    GLU 86 11/17/2023    BUN 8 (L) 11/17/2023    CREATSERUM 0.70 11/17/2023    ANIONGAP 5  11/17/2023    CA 9.1 11/17/2023     11/17/2023    K 4.2 11/17/2023     11/17/2023    CO2 25.0 11/17/2023    OSMOCALC 280 11/17/2023      Lab Results   Component Value Date    WBC 9.2 07/11/2024    RBC 4.27 07/11/2024    HGB 11.7 (L) 07/11/2024    HCT 36.6 07/11/2024    MCV 85.7 07/11/2024    MCH 27.4 07/11/2024    MCHC 32.0 07/11/2024    RDW 13.0 07/11/2024    .0 07/11/2024      Lab Results   Component Value Date    TSH 1.430 10/09/2023        ASSESSMENT AND PLAN:       ICD-10-CM    1. Wellness examination  Z00.00       2. Need for tetanus, diphtheria, and acellular pertussis (Tdap) vaccine  Z23 TdaP (Adacel, Boostrix) [32655]      3. Attention deficit hyperactivity disorder (ADHD), unspecified ADHD type  F90.9 Well controlled on current medication.      4. Acute non-recurrent maxillary sinusitis  J01.00 predniSONE 20 MG Oral Tab  There is tenderness over TMJ and maxilla. Will Rx course of steroid for persistent sinusitis. Printed information/self-care for TMJ. Recommended trial of mouth guard for grinding. She is going to ask dentist about this at next appointment.      5. Normocytic anemia  D64.9 Mild. Has been present for over 8 years. Rest of cell lines are within range.      6. Hyponatremia  E87.1 Basic Metabolic Panel (8) [E]      7. Hypertriglyceridemia  E78.1 Lipid Panel [E]      8. Pre-diabetes  R73.03 Hemoglobin A1C [E]      9. Jaw pain  R68.84 See 4.        The patient indicates understanding of these issues and agrees to the plan.  The patient is asked to return or present to the emergency room for worsening of symptoms.    TODAY'S ORDERS     No orders of the defined types were placed in this encounter.      Meds & Refills:  Requested Prescriptions      No prescriptions requested or ordered in this encounter       Imaging & Consults:  None    No follow-ups on file.  There are no Patient Instructions on file for this visit.    All questions were answered and the patient agrees with the  plan.     Thank you,  Jj Harper, DO

## 2024-10-10 DIAGNOSIS — F90.9 ATTENTION DEFICIT HYPERACTIVITY DISORDER (ADHD), UNSPECIFIED ADHD TYPE: ICD-10-CM

## 2024-10-14 RX ORDER — DEXTROAMPHETAMINE SACCHARATE, AMPHETAMINE ASPARTATE, DEXTROAMPHETAMINE SULFATE AND AMPHETAMINE SULFATE 2.5; 2.5; 2.5; 2.5 MG/1; MG/1; MG/1; MG/1
10 TABLET ORAL DAILY
Qty: 30 TABLET | Refills: 0 | Status: SHIPPED | OUTPATIENT
Start: 2024-10-14

## 2024-10-14 NOTE — TELEPHONE ENCOUNTER
Please review; protocol failed/ has no protocol      Recent fills: 08/19/2024,05/31/2024  Last Rx written: 08/19/2024  Last Office Visit: 09/24/2024    Recent Visits  Date Type Provider Dept   09/24/24 Office Visit Jj Harper,  [facility] [address]         Requested Prescriptions   Pending Prescriptions Disp Refills    amphetamine-dextroamphetamine 10 MG Oral Tab 30 tablet 0     Sig: Take 1 tablet (10 mg total) by mouth daily.       Controlled Substance Medication Failed - 10/14/2024 10:25 AM        Failed - This medication is a controlled substance - forward to provider to refill           Recent Outpatient Visits              2 weeks ago Wellness examination    [facility], [address] [place] [address], Jj Mehta,     Office Visit    8 months ago Screening for cervical cancer    [facility], [address] [place] [address], Jj Mehta,     Office Visit    12 months ago Hypertriglyceridemia    [facility], [address] [place], [place] [address]Silvestre Maryl Louisa, DO    Telemedicine    1 year ago Wellness examination    [facility], [address] [place], [place] [address]Silvestre Maryl Louisa,     Office Visit    7 years ago Need for prophylactic vaccination and inoculation against viral hepatitis    Pediatrics - [facility]    Nurse Only

## 2025-02-18 ENCOUNTER — OFFICE VISIT (OUTPATIENT)
Dept: INTERNAL MEDICINE CLINIC | Facility: CLINIC | Age: 25
End: 2025-02-18
Payer: COMMERCIAL

## 2025-02-18 VITALS
DIASTOLIC BLOOD PRESSURE: 68 MMHG | RESPIRATION RATE: 16 BRPM | HEART RATE: 101 BPM | SYSTOLIC BLOOD PRESSURE: 102 MMHG | BODY MASS INDEX: 32.44 KG/M2 | OXYGEN SATURATION: 100 % | HEIGHT: 64 IN | WEIGHT: 190 LBS

## 2025-02-18 DIAGNOSIS — M62.838 MUSCLE SPASM: Primary | ICD-10-CM

## 2025-02-18 PROCEDURE — 3078F DIAST BP <80 MM HG: CPT | Performed by: INTERNAL MEDICINE

## 2025-02-18 PROCEDURE — 3074F SYST BP LT 130 MM HG: CPT | Performed by: INTERNAL MEDICINE

## 2025-02-18 PROCEDURE — 99213 OFFICE O/P EST LOW 20 MIN: CPT | Performed by: INTERNAL MEDICINE

## 2025-02-18 PROCEDURE — G2211 COMPLEX E/M VISIT ADD ON: HCPCS | Performed by: INTERNAL MEDICINE

## 2025-02-18 PROCEDURE — 3008F BODY MASS INDEX DOCD: CPT | Performed by: INTERNAL MEDICINE

## 2025-02-18 NOTE — PROGRESS NOTES
Lisandra Aiken  7/23/2000    Chief Complaint   Patient presents with    Tics     Pt reports myofascial spasms across body including feet, calves, arm and jaw. Pt reports she consumes caffeine but not as much as previously.       SUBJECTIVE   Lisandra Aiken is a 24 year old female who presents for evaluation of muscle spasms.    Started 3 months ago and resolved. Then started again this week. Occurring in her legs, arms, jaw.  Spasms last only a few minute then stop.     She has been hydrating well. Only drinks 1-2 caffeinated beverages per day such a tea or soda. Denies increased stress.    Review of Systems   Review of Systems   No f/c/chest pain or sob. No cough. No abd pain/n/v/d. No ha or dizziness.    OBJECTIVE:   /68   Pulse 101   Resp 16   Ht 5' 4\" (1.626 m)   Wt 190 lb (86.2 kg)   LMP 09/10/2024   SpO2 100%   BMI 32.61 kg/m²   Physical Exam   Constitutional: Oriented to person, place, and time. No distress.   HEENT:  Normocephalic and atraumatic. Clicking of right jaw with opening and closing.  Cardiovascular: Normal rate, regular rhythm and intact distal pulses.  No murmur, rubs or gallops.   Pulmonary/Chest: Effort normal and breath sounds normal. No respiratory distress.  Musculoskeletal: No edema. No swelling. No synovitis of joints.   Skin: Skin is warm and dry. No rash on visualized skin.     Lab Results   Component Value Date    GLU 91 09/24/2024    BUN 6 (L) 09/24/2024    CREATSERUM 0.74 09/24/2024    ANIONGAP 10 09/24/2024    CA 9.9 09/24/2024     09/24/2024    K 4.2 09/24/2024     09/24/2024    CO2 25.0 09/24/2024    OSMOCALC 283 09/24/2024      Lab Results   Component Value Date    WBC 8.7 09/24/2024    RBC 4.42 09/24/2024    HGB 12.2 09/24/2024    HCT 37.6 09/24/2024    MCV 85.1 09/24/2024    MCH 27.6 09/24/2024    MCHC 32.4 09/24/2024    RDW 13.0 09/24/2024    .0 09/24/2024      Lab Results   Component Value Date    TSH 1.430 10/09/2023         ASSESSMENT AND PLAN:       ICD-10-CM    1. Muscle spasm  M62.838 Hemoglobin & Hematocrit     Basic Metabolic Panel (8) [E]     Magnesium [E]     CK (Creatine Kinase) (Not Creatinine) (E)     TSH W Reflex To Free T4 [E]     Hepatic Function Panel (7) [E]     Sed Rate, Westergren (Automated) [E]     Connective Tissue Disease (TIP) Screen [E]      Start with lab evaluation. Instructed patient to hydrate. If labs unremarkable then consider EMG, etc.    TODAY'S ORDERS     No orders of the defined types were placed in this encounter.      Meds & Refills:  Requested Prescriptions      No prescriptions requested or ordered in this encounter       Imaging & Consults:  None    No follow-ups on file.  There are no Patient Instructions on file for this visit.    All questions were answered and the patient agrees with the plan.     Thank you,  Jj Harper, DO

## 2025-02-19 ENCOUNTER — LAB ENCOUNTER (OUTPATIENT)
Dept: LAB | Age: 25
End: 2025-02-19
Attending: INTERNAL MEDICINE
Payer: COMMERCIAL

## 2025-02-19 DIAGNOSIS — M62.838 MUSCLE SPASM: ICD-10-CM

## 2025-02-19 LAB
ALBUMIN SERPL-MCNC: 4.9 G/DL (ref 3.2–4.8)
ALP LIVER SERPL-CCNC: 58 U/L
ALT SERPL-CCNC: 13 U/L
ANION GAP SERPL CALC-SCNC: 10 MMOL/L (ref 0–18)
AST SERPL-CCNC: 15 U/L (ref ?–34)
BILIRUB DIRECT SERPL-MCNC: <0.1 MG/DL (ref ?–0.3)
BILIRUB SERPL-MCNC: 0.3 MG/DL (ref 0.3–1.2)
BUN BLD-MCNC: 9 MG/DL (ref 9–23)
CALCIUM BLD-MCNC: 9.7 MG/DL (ref 8.7–10.6)
CHLORIDE SERPL-SCNC: 104 MMOL/L (ref 98–112)
CK SERPL-CCNC: 117 U/L
CO2 SERPL-SCNC: 24 MMOL/L (ref 21–32)
CREAT BLD-MCNC: 0.83 MG/DL
EGFRCR SERPLBLD CKD-EPI 2021: 100 ML/MIN/1.73M2 (ref 60–?)
ERYTHROCYTE [SEDIMENTATION RATE] IN BLOOD: 21 MM/HR
FASTING STATUS PATIENT QL REPORTED: YES
GLUCOSE BLD-MCNC: 92 MG/DL (ref 70–99)
HCT VFR BLD AUTO: 38.1 %
HGB BLD-MCNC: 12 G/DL
MAGNESIUM SERPL-MCNC: 2.2 MG/DL (ref 1.6–2.6)
OSMOLALITY SERPL CALC.SUM OF ELEC: 284 MOSM/KG (ref 275–295)
POTASSIUM SERPL-SCNC: 4.2 MMOL/L (ref 3.5–5.1)
PROT SERPL-MCNC: 7.6 G/DL (ref 5.7–8.2)
SODIUM SERPL-SCNC: 138 MMOL/L (ref 136–145)
TSI SER-ACNC: 2.14 UIU/ML (ref 0.55–4.78)

## 2025-02-19 PROCEDURE — 85018 HEMOGLOBIN: CPT | Performed by: INTERNAL MEDICINE

## 2025-02-19 PROCEDURE — 85652 RBC SED RATE AUTOMATED: CPT | Performed by: INTERNAL MEDICINE

## 2025-02-19 PROCEDURE — 85014 HEMATOCRIT: CPT | Performed by: INTERNAL MEDICINE

## 2025-02-19 PROCEDURE — 84443 ASSAY THYROID STIM HORMONE: CPT | Performed by: INTERNAL MEDICINE

## 2025-02-19 PROCEDURE — 80076 HEPATIC FUNCTION PANEL: CPT | Performed by: INTERNAL MEDICINE

## 2025-02-19 PROCEDURE — 86225 DNA ANTIBODY NATIVE: CPT | Performed by: INTERNAL MEDICINE

## 2025-02-19 PROCEDURE — 83735 ASSAY OF MAGNESIUM: CPT | Performed by: INTERNAL MEDICINE

## 2025-02-19 PROCEDURE — 86038 ANTINUCLEAR ANTIBODIES: CPT | Performed by: INTERNAL MEDICINE

## 2025-02-19 PROCEDURE — 80048 BASIC METABOLIC PNL TOTAL CA: CPT | Performed by: INTERNAL MEDICINE

## 2025-02-19 PROCEDURE — 82550 ASSAY OF CK (CPK): CPT | Performed by: INTERNAL MEDICINE

## 2025-02-20 LAB
DSDNA IGG SERPL IA-ACNC: 1.6 IU/ML
ENA AB SER QL IA: 0.1 UG/L
ENA AB SER QL IA: NEGATIVE

## 2025-02-27 ENCOUNTER — PATIENT MESSAGE (OUTPATIENT)
Dept: INTERNAL MEDICINE CLINIC | Facility: CLINIC | Age: 25
End: 2025-02-27

## 2025-02-27 DIAGNOSIS — M62.838 MUSCLE SPASM: Primary | ICD-10-CM

## 2025-04-10 DIAGNOSIS — F90.9 ATTENTION DEFICIT HYPERACTIVITY DISORDER (ADHD), UNSPECIFIED ADHD TYPE: ICD-10-CM

## 2025-04-15 RX ORDER — DEXTROAMPHETAMINE SACCHARATE, AMPHETAMINE ASPARTATE, DEXTROAMPHETAMINE SULFATE AND AMPHETAMINE SULFATE 2.5; 2.5; 2.5; 2.5 MG/1; MG/1; MG/1; MG/1
10 TABLET ORAL DAILY
Qty: 30 TABLET | Refills: 0 | Status: SHIPPED | OUTPATIENT
Start: 2025-04-15 | End: 2025-06-05

## 2025-04-15 NOTE — TELEPHONE ENCOUNTER
Please review: medication fails/has no protocol attached.    Patient has not filled her adderall prescription since 10/2024. Is patient still taking?    No future appointments with primary care medicine.    Recent fill dates: No dispense history in the last 6 months  Date of  last written prescription: 10/14/24   Last prescribed quantity: #30  Last office visit: 2/18/25  [] Takes as needed  [x] Takes scheduled daily

## 2025-04-29 ENCOUNTER — OFFICE VISIT (OUTPATIENT)
Dept: NEUROLOGY | Facility: CLINIC | Age: 25
End: 2025-04-29
Payer: COMMERCIAL

## 2025-04-29 VITALS
RESPIRATION RATE: 16 BRPM | DIASTOLIC BLOOD PRESSURE: 67 MMHG | BODY MASS INDEX: 32.44 KG/M2 | SYSTOLIC BLOOD PRESSURE: 110 MMHG | WEIGHT: 190 LBS | HEART RATE: 88 BPM | HEIGHT: 64 IN

## 2025-04-29 DIAGNOSIS — R20.8 DECREASED VIBRATORY SENSE: ICD-10-CM

## 2025-04-29 DIAGNOSIS — R25.3 MUSCLE TWITCHING: ICD-10-CM

## 2025-04-29 DIAGNOSIS — R25.3 BENIGN FASCICULATIONS: Primary | ICD-10-CM

## 2025-04-29 PROCEDURE — 3074F SYST BP LT 130 MM HG: CPT | Performed by: OTHER

## 2025-04-29 PROCEDURE — 3008F BODY MASS INDEX DOCD: CPT | Performed by: OTHER

## 2025-04-29 PROCEDURE — 99204 OFFICE O/P NEW MOD 45 MIN: CPT | Performed by: OTHER

## 2025-04-29 PROCEDURE — 3078F DIAST BP <80 MM HG: CPT | Performed by: OTHER

## 2025-04-29 RX ORDER — CICLOPIROX 1 G/100ML
SHAMPOO TOPICAL
COMMUNITY
Start: 2025-02-27

## 2025-04-29 NOTE — PROGRESS NOTES
[facility] New Patient / Consult Visit    Lisandra Aiken is a 24 year old female.                         Referring MD: Jj Harper DO      Chief Complaint   Patient presents with    Neurologic Problem     C/O of full body twitching since October 2024 then it subsided and came back two months ago    Test Results     Labs 02/19/2025       HPI:    Lisandra Aiken is a 24 year old, who presents for evaluation of muscle twitching.    Patient previously was doing well but started to notice episodes of \"twitching\" of muscles recently. She was seen by PCP 2/2025 and had been having episodes 3 months prior to this but they resolved spontaneously before becoming more frequent shortly prior to that visit. She had workup with labs showing normal CBC, CMP, TSH, magnesium but very minimal elevation in sed rate; prior workup for anemia in the past with normal B12 level 4/2024.      She notes twitching would be intermittent, and may occur in the hand or or leg or face - currently they are occurring intermittently 5-6 times per day but previousoly were occurring ~15 times in an hour - these latter instances were frequent in 10/2024 for ~2 weeks and more recently 2/2025 for ~2 weeks. She was started on Magnesium by PCP in 2/2025. She denies sustained contractions and denies numbness/tingling in hands/ feet or dropping objects from hands or issues with swallowing.     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   Otherwise, patient denies any recent weight change, fevers, chills, nausea, double vision/ blurry vision / loss of vision, chest pain, palpitations, shortness of breath, rashes, joint pains, bowel / bladder incontinence or mood issues.     Past Medical History[1]  Past Surgical History[2]  Short Social Hx on File[3]  Family History[4]    Allergies:  Allergies[5]   Current Meds:  Current Medications[6]       ROS:   A comprehensive 10 point review of systems  was completed.  Pertinent positives and negatives noted in the the HPI.      PHYSICAL EXAM:   /67 (BP Location: Left arm, Patient Position: Sitting, Cuff Size: large)   Pulse 88   Resp 16   Ht 64\"   Wt 190 lb (86.2 kg)   LMP 04/17/2025   BMI 32.61 kg/m²   Estimated body mass index is 32.61 kg/m² as calculated from the following:    Height as of this encounter: 64\".    Weight as of this encounter: 190 lb (86.2 kg).    GENERAL: well developed, well nourished, in no apparent distress  SKIN: no rashes  EYES: sclera anicteric, conjunctiva normal  HEENT: normocephalic  CARDIOVASCULAR: S1, S2 normal, RRR  LUNGS: clear to auscultation bilaterally  EXTREMITIES: no cyanosis, peripheral pulses intact    Neck: Supple; full range of motion; no carotid bruits    Mental status:  Alert and oriented to time, place, person, and situation  Speech: fluent  Language: normal naming, repetition, and comprehension  Memory: normal  Attention/concentration: normal    Fundoscopic Exam: optic discs sharp bilaterally    Cranial Nerves: II through XII  Optic:    Pupils: equally round and reactive to light with direct and consensual responses, normal accomodation   Visual acuity: Normal              Visual fields: Normal  Oculomotor/Trochlear/Abducens:    Eye Movements: EOMI without nystagmus  Trigeminal:   Facial sensation:intact to light touch bilaterally  Facial:   Smile symmetric, eyebrow raise symmetric  Vestibulocochlear:   Hearing: normal bilaterally  Glossopharyngeal/Vagus:   Palate elevates symmetrically with midline uvula  Spinal accessory:   Shoulder Shrug: normal bilaterally   Lateral head turn: normal bilaterally  Hypoglossal:   Tongue movement: protrusion is midline with normal lateral movements    Motor System:  Strength: 5/5 throughout  Tone: normal    Sensory:  Pin is normal  Vibration is normal  Proprioception is normal  Romberg is absent    Coordination:  Finger to nose normal bilaterally  Rapid alternating  movements normal bilaterally  Heel to shin is normal bilaterally    DTRs:   2+, symmetric throughout, toes downgoing bilaterally; no clonus          Gait:  Normal casual, heel, toe and tandem gait    TEST RESULTS/DATA REVIEWED:   No results found.    Labs    Reviewed in EMR as noted in HPI    IMPRESSION AND PLAN:   Lisandra Aiken is a 24 year old female who presents for evaluation of muscle twitching.    Patient previously was doing well but started to notice episodes of \"twitching\" of muscles recently. She was seen by PCP 2/2025 and had been having episodes 3 months prior to this but they resolved spontaneously before becoming more frequent shortly prior to that visit. She had workup with labs showing normal CBC, CMP, TSH, magnesium but very minimal elevation in sed rate; prior workup for anemia in the past with normal B12 level 4/2024.      She notes twitching would be intermittent, and may occur in the hand or or leg or face - currently they are occurring intermittently 5-6 times per day but previousoly were occurring ~15 times in an hour - these latter instances were frequent in 10/2024 for ~2 weeks and more recently 2/2025 for ~2 weeks. She was started on Magnesium by PCP in 2/2025. She denies sustained contractions and denies numbness/tingling in hands/ feet or dropping objects from hands or issues with swallowing.     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     1. Benign fasciculations  As noted above   - EMG ([facility] [place]); Future  - Vitamin B12; Future    2. Muscle twitching  As noted above   - EMG ([facility] [place]); Future  - Vitamin B12; Future    3. Decreased vibratory sense  As noted above    - EMG ([facility]); Future  - Vitamin B12; Future    Copy of note was sent to referring physician.      Rashel Ribeiro MD, Neurology  [facility]  Pager [phone]  4/29/2025             [1]   Past Medical History:   Eczema   [2]   Past Surgical History:  Procedure Laterality Date    Laser surgery of eye Left    [3]   Social History  Socioeconomic History    Marital status: Single   Tobacco Use    Smoking status: Never     Passive exposure: Never   Vaping Use    Vaping status: Never Used   Substance and Sexual Activity    Alcohol use: Yes     Comment: socially    Drug use: Never    Sexual activity: Never   Other Topics Concern    Caffeine Concern Yes     Comment: 2 a day    Exercise Yes     Comment: 3 x week   [4]   Family History  Problem Relation Age of Onset    Hyperlipidemia Father     Hypertension Father     Diabetes Father     Seizure Disorder Father     Hypertension Mother     Diabetes Mother     High Blood Pressure Mother     High Cholesterol Mother     Hyperlipidemia Mother     Arthritis Neg     Asthma Neg     Birth Defects Neg     Cancer Neg     Heart Disease Neg     Kidney Disease Neg     Learning Disability Neg     Mental Disorder Neg     Stroke Neg     Substance Abuse Neg    [5]   Allergies  Allergen Reactions    Cat Hair Extract RASH     Congestion and skin flare up s   [6]   Current Outpatient Medications   Medication Sig Dispense Refill    Ciclopirox 1 % External Shampoo       amphetamine-dextroamphetamine 10 MG Oral Tab Take 1 tablet (10 mg total) by mouth daily. 30 tablet 0    ketoconazole 2 % External Shampoo       tacrolimus 0.1 % External Ointment       triamcinolone 0.1 % External Cream

## 2025-04-29 NOTE — PATIENT INSTRUCTIONS
Refill policies:    Allow 2-3 business days for refills; controlled substances may take longer.  Contact your pharmacy at least 5 days prior to running out of medication and have them send an electronic request or submit request through the “request refill” option in your [employer] account.  Refills are not addressed on weekends; covering physicians do not authorize routine medications on weekends.  No narcotics or controlled substances are refilled after noon on Fridays or by on call physicians.  By law, narcotics must be electronically prescribed.  A 30 day supply with no refills is the maximum allowed.  If your prescription is due for a refill, you may be due for a follow up appointment.  To best provide you care, patients receiving routine medications need to be seen at least once a year.  Patients receiving narcotic/controlled substance medications need to be seen at least once every 3 months.  In the event that your preferred pharmacy does not have the requested medication in stock (e.g. Backordered), it is your responsibility to find another pharmacy that has the requested medication available.  We will gladly send a new prescription to that pharmacy at your request.    Scheduling Tests:    If your physician has ordered radiology tests such as MRI or CT scans, please contact Central Scheduling at [phone] right away to schedule the test.  Once scheduled, the [facility] Centralized Referral Team will work with your insurance carrier to obtain pre-certification or prior authorization.  Depending on your insurance carrier, approval may take 3-10 days.  It is highly recommended patients assure they have received an authorization before having a test performed.  If test is done without insurance authorization, patient may be responsible for the entire amount billed.      Precertification and Prior Authorizations:  If your physician has recommended that you have a procedure or additional testing performed the [facility]  Centralized Referral Team will contact your insurance carrier to obtain pre-certification or prior authorization.    You are strongly encouraged to contact your insurance carrier to verify that your procedure/test has been approved and is a COVERED benefit.  Although the [facility] Centralized Referral Team does its due diligence, the insurance carrier gives the disclaimer that \"Although the procedure is authorized, this does not guarantee payment.\"    Ultimately the patient is responsible for payment.   Thank you for your understanding in this matter.  Paperwork Completion:  If you require FMLA or disability paperwork for your recovery, please make sure to either drop it off or have it faxed to our office at [phone]. Be sure the form has your name and date of birth on it.  The form will be faxed to our Forms Department and they will complete it for you.  There is a 25$ fee for all forms that need to be filled out.  Please be aware there is a 10-14 day turnaround time.  You will need to sign a release of information (DHRUV) form if your paperwork does not come with one.  You may call the Forms Department with any questions at [phone].  Their fax number is [phone].

## 2025-06-03 ENCOUNTER — PROCEDURE VISIT (OUTPATIENT)
Dept: NEUROLOGY | Facility: CLINIC | Age: 25
End: 2025-06-03
Payer: COMMERCIAL

## 2025-06-03 DIAGNOSIS — R25.3 BENIGN FASCICULATIONS: ICD-10-CM

## 2025-06-03 DIAGNOSIS — R20.8 DECREASED VIBRATORY SENSE: ICD-10-CM

## 2025-06-03 DIAGNOSIS — R25.3 MUSCLE TWITCHING: ICD-10-CM

## 2025-06-03 NOTE — PROCEDURES
[facility]  [address], [place] [address]   [phone]        Full Name: ANDREEA NARAYAN Gender: Female  Patient ID: [id number] YOB: 2000      Visit Date: 6/3/2025 8:24 AM  Age: 24 Years  Examining Physician: LUIS MACIAS MD  Height: 5 feet 4 inch  Weight: 190 lbs  History:     Focused HPI:   This is a 24 year old female who presents for evaluation of muscle twitching.    Patient previously was doing well but started to notice episodes of \"twitching\" of muscles recently. She was seen by PCP 2/2025 and had been having episodes 3 months prior to this but they resolved spontaneously before becoming more frequent shortly prior to that visit. She had workup with labs showing normal CBC, CMP, TSH, magnesium but very minimal elevation in sed rate; prior workup for anemia in the past with normal B12 level 4/2024.       She notes twitching would be intermittent, and may occur in the hand or or leg or face - currently they are occurring intermittently 5-6 times per day but previousoly were occurring ~15 times in an hour - these latter instances were frequent in 10/2024 for ~2 weeks and more recently 2/2025 for ~2 weeks. She was started on Magnesium by PCP in 2/2025. She denies sustained contractions and denies numbness/tingling in hands/ feet or dropping objects from hands or issues with swallowing.      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     Focused Exam:   Motor: 5/5 strength throughout  DTRs: 2+, symmetric throughout, toes downgoing bilaterally, no clonus  Sensory: Intact to pin, light touch, vibration, and proprioception    Sensory NCS       Nerve / Sites Rec. Site Onset Lat Peak Lat NP Amp PP Amp Segments Distance Velocity Comment     ms ms µV µV  cm m/s    R Median - Dig II (Antidromic)      Wrist Index 2.45 3.23 97.1 158.2 Wrist - Index 14 57       Ref.  <=3.30 <=4.00 >=11.0 >=13.0 Ref.      R Ulnar - Dig V (Antidromic)      Wrist Dig V 2.08 2.81 94.8 155.6 Wrist - Dig V 11 53       Ref.  <=3.10 <=4.00 >=11.0 >=8.0 Ref.      R Radial - Superficial (Antidromic)      Forearm Wrist 1.67 2.34 55.7 34.8 Forearm - Wrist 10 60       Ref.  <=2.20 <=2.80 >=7.0 >=11.0 Ref.      R Sural - (Antidromic)      Calf Ankle 3.75 4.64 15.3 21.1 Calf - Ankle 14 37       Ref.  <=3.60 <=4.50 >=4.0 >=4.0 Ref.          Motor NCS      Nerve / Sites Muscle Latency Amplitude Segments Dist. Lat Diff Velocity Comments     ms mV  cm ms m/s    R Median - APB      Wrist APB 3.42 11.5 Wrist - APB 7         Ref.  <=4.40 >=5.9 Ref.          Elbow APB 7.42 11.1 Elbow - Wrist 22.5 4.00 56.3       Ref.    Ref.   >=53.0    R Ulnar - ADM      Wrist ADM 2.71 10.4 Wrist - ADM 7         Ref.  <=3.70 >=7.9 Ref.          B.Elbow ADM 6.52 10.0 B.Elbow - Wrist 21 3.81 55.1       Ref.    Ref.   >=52.0    R Peroneal - EDB      Ankle EDB 5.50 7.6 Ankle - EDB 7         Ref.  <=6.50 >=2.6 Ref.          B. Fib Head EDB 13.15 7.3 B. Fib Head - Ankle 32.5 7.65 42.5       Ref.    Ref.   >=43.0    R Tibial - AH      Ankle AH 4.83 10.2 Ankle - AH 7         Ref.  <=6.10 >=5.8 Ref.          Knee AH 13.40 8.4 Knee - Ankle 39 8.56 45.5       Ref.    Ref.   >=42.0        F  Wave      Nerve M Latency F Latency    ms ms   R Peroneal - EDB 3.9 49.7   Ref.  <=54.5   R Tibial - AH 5.5 48.2   Ref.  <=53.2   R Median - APB 3.8 26.5   Ref.  <=28.5   R Ulnar - ADM 3.1 25.5   Ref.  <=30.2       EMG Summary Table     Spontaneous MUAP Recruitment   Muscle IA Fib PSW Fasc H.F. Amp Dur. PPP Pattern   R. Deltoid N None None None None N N N N   R. Triceps brachii N None None None None N N N N   R. Biceps brachii N None None  None None N N N N   R. Extensor digitorum communis N None None None None N N N N   R. First dorsal interosseous N None None None None N N N N   R. Vastus medialis N None None None None N N N N   R. Peroneus longus N None None None None N N N N   R. Tibialis anterior N None None None None N N N N   R. Gastrocnemius N None None None None N N N N   R. Extensor hallucis longus N None None None None N N N N       Summary    Nerve conduction studies:  Right sural sensory response was borderline due to borderline peak latency, with normal amplitude, and borderline conduction velocity; rapid cooling should be considered.  Right median sensory response was normal.  Right ulnar sensory response was normal.  Right radial sensory response was normal.  Right common peroneal motor response was normal; F-wave response was normal.  Right tibial motor response was normal; F response was normal.  Right median motor response was normal; F-wave response was normal.  Right ulnar motor response was normal; F-wave response was normal.    EMG ( needle exam):   Concentric needle EMG study was normal in all 10 tested muscles: R. Deltoid, R. Triceps brachii, R. Biceps brachii, R. Extensor digitorum communis, R. First dorsal interosseous, R. Vastus medialis, R. Peroneus longus, R. Tibialis anterior, R. Gastrocnemius, R. Extensor hallucis longus.        Conclusion:   This is a normal study.  There is no electrophysiologic evidence to suggest an underlying large fiber polyneuropathy, primary demyelinating neuropathy, mononeuritis multiplex, myopathy, or motor neuronopathy.  Of note, this test would not rule out a small fiber neuropathy, or central etiology.  Clinical correlation is advised.    Rashel Ribeiro MD, Neurology  [facility]  Pager [phone]  6/3/2025        ________________________

## 2025-07-21 ENCOUNTER — LAB ENCOUNTER (OUTPATIENT)
Dept: LAB | Age: 25
End: 2025-07-21
Attending: Other
Payer: COMMERCIAL

## 2025-07-21 DIAGNOSIS — R25.3 MUSCLE TWITCHING: ICD-10-CM

## 2025-07-21 DIAGNOSIS — R20.8 DECREASED VIBRATORY SENSE: ICD-10-CM

## 2025-07-21 DIAGNOSIS — R25.3 BENIGN FASCICULATIONS: ICD-10-CM

## 2025-07-21 LAB — VIT B12 SERPL-MCNC: 668 PG/ML (ref 211–911)

## 2025-07-21 PROCEDURE — 82607 VITAMIN B-12: CPT | Performed by: OTHER

## 2025-08-05 ENCOUNTER — OFFICE VISIT (OUTPATIENT)
Dept: NEUROLOGY | Facility: CLINIC | Age: 25
End: 2025-08-05

## 2025-08-05 VITALS
DIASTOLIC BLOOD PRESSURE: 73 MMHG | HEIGHT: 64 IN | SYSTOLIC BLOOD PRESSURE: 117 MMHG | WEIGHT: 190 LBS | HEART RATE: 85 BPM | BODY MASS INDEX: 32.44 KG/M2 | RESPIRATION RATE: 16 BRPM

## 2025-08-05 DIAGNOSIS — R25.3 BENIGN FASCICULATIONS: Primary | ICD-10-CM

## 2025-08-05 PROCEDURE — 3078F DIAST BP <80 MM HG: CPT | Performed by: OTHER

## 2025-08-05 PROCEDURE — 3008F BODY MASS INDEX DOCD: CPT | Performed by: OTHER

## 2025-08-05 PROCEDURE — 99213 OFFICE O/P EST LOW 20 MIN: CPT | Performed by: OTHER

## 2025-08-05 PROCEDURE — 3074F SYST BP LT 130 MM HG: CPT | Performed by: OTHER

## (undated) NOTE — LETTER
04/30/25    Dear Dr. Jj Harper DO        Thank you for referring your patient, Lisandra Aiken to me for an evaluation.  Please see my initial consult note enclosed below.  Let me know if you have any questions.    Thank you  Rashel Ribeiro MD, Neurology  [facility]  Pager [phone]  Office:    [address] [place] [id number]  [phone]      4/30/2025    [facility] New Patient / Consult Visit    Lisandra Aiken is a 24 year old female.                         Referring MD: Jj Harper DO      Chief Complaint   Patient presents with    Neurologic Problem     C/O of full body twitching since October 2024 then it subsided and came back two months ago    Test Results     Labs 02/19/2025       HPI:    Lisandra Aiken is a 24 year old, who presents for evaluation of muscle twitching.    Patient previously was doing well but started to notice episodes of \"twitching\" of muscles recently. She was seen by PCP 2/2025 and had been having episodes 3 months prior to this but they resolved spontaneously before becoming more frequent shortly prior to that visit. She had workup with labs showing normal CBC, CMP, TSH, magnesium but very minimal elevation in sed rate; prior workup for anemia in the past with normal B12 level 4/2024.      She notes twitching would be intermittent, and may occur in the hand or or leg or face - currently they are occurring intermittently 5-6 times per day but previousoly were occurring ~15 times in an hour - these latter instances were frequent in 10/2024 for ~2 weeks and more recently 2/2025 for ~2 weeks. She was started on Magnesium by PCP in 2/2025. She denies sustained contractions and denies numbness/tingling in hands/ feet or dropping objects from hands or issues with swallowing.     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   Otherwise, patient denies any recent  weight change, fevers, chills, nausea, double vision/ blurry vision / loss of vision, chest pain, palpitations, shortness of breath, rashes, joint pains, bowel / bladder incontinence or mood issues.     [Past Medical History]    [Past Medical History]   Eczema     [Past Surgical History]    [Past Surgical History]  Procedure Laterality Date    Laser surgery of eye Left      [Short Social Hx on File]    [Short Social Hx on File]  Social History  Socioeconomic History    Marital status: Single   Tobacco Use    Smoking status: Never     Passive exposure: Never   Vaping Use    Vaping status: Never Used   Substance and Sexual Activity    Alcohol use: Yes     Comment: socially    Drug use: Never    Sexual activity: Never   Other Topics Concern    Caffeine Concern Yes     Comment: 2 a day    Exercise Yes     Comment: 3 x week     [Family History]    [Family History]  Problem Relation Age of Onset    Hyperlipidemia Father     Hypertension Father     Diabetes Father     Seizure Disorder Father     Hypertension Mother     Diabetes Mother     High Blood Pressure Mother     High Cholesterol Mother     Hyperlipidemia Mother     Arthritis Neg     Asthma Neg     Birth Defects Neg     Cancer Neg     Heart Disease Neg     Kidney Disease Neg     Learning Disability Neg     Mental Disorder Neg     Stroke Neg     Substance Abuse Neg        Allergies:  [Allergies]    [Allergies]  Allergen Reactions    Cat Hair Extract RASH     Congestion and skin flare up s        Current Meds:  [Current Medications]    [Current Medications]  Current Outpatient Medications   Medication Sig Dispense Refill    Ciclopirox 1 % External Shampoo       amphetamine-dextroamphetamine 10 MG Oral Tab Take 1 tablet (10 mg total) by mouth daily. 30 tablet 0    ketoconazole 2 % External Shampoo       tacrolimus 0.1 % External Ointment       triamcinolone 0.1 % External Cream               ROS:   A comprehensive 10 point review of systems was completed.  Pertinent  positives and negatives noted in the the HPI.      PHYSICAL EXAM:   /67 (BP Location: Left arm, Patient Position: Sitting, Cuff Size: large)   Pulse 88   Resp 16   Ht 64\"   Wt 190 lb (86.2 kg)   LMP 04/17/2025   BMI 32.61 kg/m²   Estimated body mass index is 32.61 kg/m² as calculated from the following:    Height as of this encounter: 64\".    Weight as of this encounter: 190 lb (86.2 kg).    GENERAL: well developed, well nourished, in no apparent distress  SKIN: no rashes  EYES: sclera anicteric, conjunctiva normal  HEENT: normocephalic  CARDIOVASCULAR: S1, S2 normal, RRR  LUNGS: clear to auscultation bilaterally  EXTREMITIES: no cyanosis, peripheral pulses intact    Neck: Supple; full range of motion; no carotid bruits    Mental status:  Alert and oriented to time, place, person, and situation  Speech: fluent  Language: normal naming, repetition, and comprehension  Memory: normal  Attention/concentration: normal    Fundoscopic Exam: optic discs sharp bilaterally    Cranial Nerves: II through XII  Optic:    Pupils: equally round and reactive to light with direct and consensual responses, normal accomodation   Visual acuity: Normal              Visual fields: Normal  Oculomotor/Trochlear/Abducens:    Eye Movements: EOMI without nystagmus  Trigeminal:   Facial sensation:intact to light touch bilaterally  Facial:   Smile symmetric, eyebrow raise symmetric  Vestibulocochlear:   Hearing: normal bilaterally  Glossopharyngeal/Vagus:   Palate elevates symmetrically with midline uvula  Spinal accessory:   Shoulder Shrug: normal bilaterally   Lateral head turn: normal bilaterally  Hypoglossal:   Tongue movement: protrusion is midline with normal lateral movements    Motor System:  Strength: 5/5 throughout  Tone: normal    Sensory:  Pin is normal  Vibration is normal  Proprioception is normal  Romberg is absent    Coordination:  Finger to nose normal bilaterally  Rapid alternating movements normal bilaterally  Heel  to shin is normal bilaterally    DTRs:   2+, symmetric throughout, toes downgoing bilaterally; no clonus          Gait:  Normal casual, heel, toe and tandem gait    TEST RESULTS/DATA REVIEWED:   No results found.    Labs    Reviewed in EMR as noted in HPI    IMPRESSION AND PLAN:   Lisandra Aiken is a 24 year old female who presents for evaluation of muscle twitching.    Patient previously was doing well but started to notice episodes of \"twitching\" of muscles recently. She was seen by PCP 2/2025 and had been having episodes 3 months prior to this but they resolved spontaneously before becoming more frequent shortly prior to that visit. She had workup with labs showing normal CBC, CMP, TSH, magnesium but very minimal elevation in sed rate; prior workup for anemia in the past with normal B12 level 4/2024.      She notes twitching would be intermittent, and may occur in the hand or or leg or face - currently they are occurring intermittently 5-6 times per day but previousoly were occurring ~15 times in an hour - these latter instances were frequent in 10/2024 for ~2 weeks and more recently 2/2025 for ~2 weeks. She was started on Magnesium by PCP in 2/2025. She denies sustained contractions and denies numbness/tingling in hands/ feet or dropping objects from hands or issues with swallowing.     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     1. Benign fasciculations  As noted above   - EMG ([facility]); Future  - Vitamin B12; Future    2. Muscle twitching  As noted above   - EMG ([facility]); Future  - Vitamin B12; Future    3. Decreased vibratory sense  As noted above   - EMG ([facility]); Future  -  Vitamin B12; Future    Copy of note was sent to referring physician.      Rashel Ribeiro MD, Neurology  [facility]  Pager [phone]  4/29/2025

## (undated) NOTE — ED AVS SNAPSHOT
Aparna Liang   MRN: [id number]    Department:  THE [facility] in [place]   Date of Visit:  9/29/2017           Disclosure     Insurance plans vary and the physician(s) referred by the ER may not be covered by your plan.  Kimberlyn If you have been prescribed any medication(s), please fill your prescription right away and begin taking the medication(s) as directed    If the emergency physician has read X-rays, these will be re-interpreted by a radiologist.  If there is a significant